# Patient Record
Sex: FEMALE | Race: WHITE
[De-identification: names, ages, dates, MRNs, and addresses within clinical notes are randomized per-mention and may not be internally consistent; named-entity substitution may affect disease eponyms.]

---

## 2018-01-18 ENCOUNTER — TRANSCRIPTION ENCOUNTER (OUTPATIENT)
Age: 58
End: 2018-01-18

## 2018-01-18 ENCOUNTER — NON-APPOINTMENT (OUTPATIENT)
Age: 58
End: 2018-01-18

## 2018-01-18 ENCOUNTER — APPOINTMENT (OUTPATIENT)
Dept: FAMILY MEDICINE | Facility: CLINIC | Age: 58
End: 2018-01-18
Payer: COMMERCIAL

## 2018-01-18 VITALS
RESPIRATION RATE: 14 BRPM | WEIGHT: 125 LBS | OXYGEN SATURATION: 98 % | HEART RATE: 75 BPM | BODY MASS INDEX: 22.15 KG/M2 | SYSTOLIC BLOOD PRESSURE: 100 MMHG | DIASTOLIC BLOOD PRESSURE: 60 MMHG | HEIGHT: 63 IN

## 2018-01-18 DIAGNOSIS — Z13.6 ENCOUNTER FOR SCREENING FOR CARDIOVASCULAR DISORDERS: ICD-10-CM

## 2018-01-18 DIAGNOSIS — Z13.29 ENCOUNTER FOR SCREENING FOR OTHER SUSPECTED ENDOCRINE DISORDER: ICD-10-CM

## 2018-01-18 DIAGNOSIS — Z87.891 PERSONAL HISTORY OF NICOTINE DEPENDENCE: ICD-10-CM

## 2018-01-18 DIAGNOSIS — Z80.2 FAMILY HISTORY OF MALIGNANT NEOPLASM OF OTHER RESPIRATORY AND INTRATHORACIC ORGANS: ICD-10-CM

## 2018-01-18 DIAGNOSIS — Z13.1 ENCOUNTER FOR SCREENING FOR DIABETES MELLITUS: ICD-10-CM

## 2018-01-18 DIAGNOSIS — Z12.11 ENCOUNTER FOR SCREENING FOR MALIGNANT NEOPLASM OF COLON: ICD-10-CM

## 2018-01-18 DIAGNOSIS — Z00.00 ENCOUNTER FOR GENERAL ADULT MEDICAL EXAMINATION W/OUT ABNORMAL FINDINGS: ICD-10-CM

## 2018-01-18 DIAGNOSIS — Z13.220 ENCOUNTER FOR SCREENING FOR LIPOID DISORDERS: ICD-10-CM

## 2018-01-18 DIAGNOSIS — F41.9 ANXIETY DISORDER, UNSPECIFIED: ICD-10-CM

## 2018-01-18 DIAGNOSIS — Z12.31 ENCOUNTER FOR SCREENING MAMMOGRAM FOR MALIGNANT NEOPLASM OF BREAST: ICD-10-CM

## 2018-01-18 LAB — CYTOLOGY CVX/VAG DOC THIN PREP: NORMAL

## 2018-01-18 PROCEDURE — 99386 PREV VISIT NEW AGE 40-64: CPT | Mod: 25

## 2018-01-18 PROCEDURE — 36415 COLL VENOUS BLD VENIPUNCTURE: CPT

## 2018-01-18 PROCEDURE — 93000 ELECTROCARDIOGRAM COMPLETE: CPT

## 2018-01-18 RX ORDER — FLUTICASONE PROPIONATE 50 UG/1
50 SPRAY, METERED NASAL
Qty: 1 | Refills: 1 | Status: ACTIVE | COMMUNITY
Start: 2018-01-18 | End: 1900-01-01

## 2018-01-31 ENCOUNTER — RESULT REVIEW (OUTPATIENT)
Age: 58
End: 2018-01-31

## 2018-02-03 ENCOUNTER — APPOINTMENT (OUTPATIENT)
Dept: FAMILY MEDICINE | Facility: CLINIC | Age: 58
End: 2018-02-03
Payer: COMMERCIAL

## 2018-02-03 VITALS
OXYGEN SATURATION: 96 % | BODY MASS INDEX: 22.15 KG/M2 | TEMPERATURE: 100.6 F | SYSTOLIC BLOOD PRESSURE: 110 MMHG | RESPIRATION RATE: 16 BRPM | HEART RATE: 115 BPM | DIASTOLIC BLOOD PRESSURE: 60 MMHG | WEIGHT: 125 LBS | HEIGHT: 63 IN

## 2018-02-03 DIAGNOSIS — R09.82 POSTNASAL DRIP: ICD-10-CM

## 2018-02-03 DIAGNOSIS — J10.1 INFLUENZA DUE TO OTHER IDENTIFIED INFLUENZA VIRUS WITH OTHER RESPIRATORY MANIFESTATIONS: ICD-10-CM

## 2018-02-03 DIAGNOSIS — R68.89 OTHER GENERAL SYMPTOMS AND SIGNS: ICD-10-CM

## 2018-02-03 DIAGNOSIS — Z87.898 PERSONAL HISTORY OF OTHER SPECIFIED CONDITIONS: ICD-10-CM

## 2018-02-03 DIAGNOSIS — R07.89 OTHER CHEST PAIN: ICD-10-CM

## 2018-02-03 LAB
FLUAV SPEC QL CULT: POSITIVE
FLUBV AG SPEC QL IA: NEGATIVE

## 2018-02-03 PROCEDURE — 87804 INFLUENZA ASSAY W/OPTIC: CPT | Mod: QW

## 2018-02-03 PROCEDURE — 99213 OFFICE O/P EST LOW 20 MIN: CPT | Mod: 25

## 2018-02-03 RX ORDER — OSELTAMIVIR PHOSPHATE 75 MG/1
75 CAPSULE ORAL TWICE DAILY
Qty: 10 | Refills: 0 | Status: ACTIVE | COMMUNITY
Start: 2018-02-03 | End: 1900-01-01

## 2024-05-09 ENCOUNTER — INPATIENT (INPATIENT)
Facility: HOSPITAL | Age: 64
LOS: 0 days | Discharge: HOME | DRG: 69 | End: 2024-05-10
Attending: STUDENT IN AN ORGANIZED HEALTH CARE EDUCATION/TRAINING PROGRAM | Admitting: HOSPITALIST
Payer: COMMERCIAL

## 2024-05-09 VITALS — WEIGHT: 128.75 LBS

## 2024-05-09 DIAGNOSIS — G45.9 TRANSIENT CEREBRAL ISCHEMIC ATTACK, UNSPECIFIED: ICD-10-CM

## 2024-05-09 LAB
ALBUMIN SERPL ELPH-MCNC: 4 G/DL — SIGNIFICANT CHANGE UP (ref 3.3–5)
ALP SERPL-CCNC: 64 U/L — SIGNIFICANT CHANGE UP (ref 40–120)
ALT FLD-CCNC: 30 U/L — SIGNIFICANT CHANGE UP (ref 12–78)
ANION GAP SERPL CALC-SCNC: 4 MMOL/L — LOW (ref 5–17)
APTT BLD: 28.7 SEC — SIGNIFICANT CHANGE UP (ref 24.5–35.6)
AST SERPL-CCNC: 21 U/L — SIGNIFICANT CHANGE UP (ref 15–37)
BASOPHILS # BLD AUTO: 0.05 K/UL — SIGNIFICANT CHANGE UP (ref 0–0.2)
BASOPHILS NFR BLD AUTO: 0.5 % — SIGNIFICANT CHANGE UP (ref 0–2)
BILIRUB SERPL-MCNC: 0.9 MG/DL — SIGNIFICANT CHANGE UP (ref 0.2–1.2)
BUN SERPL-MCNC: 20 MG/DL — SIGNIFICANT CHANGE UP (ref 7–23)
CALCIUM SERPL-MCNC: 9.5 MG/DL — SIGNIFICANT CHANGE UP (ref 8.5–10.1)
CHLORIDE SERPL-SCNC: 108 MMOL/L — SIGNIFICANT CHANGE UP (ref 96–108)
CK SERPL-CCNC: 57 U/L — SIGNIFICANT CHANGE UP (ref 26–192)
CO2 SERPL-SCNC: 29 MMOL/L — SIGNIFICANT CHANGE UP (ref 22–31)
CREAT SERPL-MCNC: 0.78 MG/DL — SIGNIFICANT CHANGE UP (ref 0.5–1.3)
EGFR: 85 ML/MIN/1.73M2 — SIGNIFICANT CHANGE UP
EOSINOPHIL # BLD AUTO: 0.1 K/UL — SIGNIFICANT CHANGE UP (ref 0–0.5)
EOSINOPHIL NFR BLD AUTO: 1 % — SIGNIFICANT CHANGE UP (ref 0–6)
GLUCOSE BLDC GLUCOMTR-MCNC: 96 MG/DL — SIGNIFICANT CHANGE UP (ref 70–99)
GLUCOSE SERPL-MCNC: 110 MG/DL — HIGH (ref 70–99)
HCT VFR BLD CALC: 41.2 % — SIGNIFICANT CHANGE UP (ref 34.5–45)
HGB BLD-MCNC: 13.6 G/DL — SIGNIFICANT CHANGE UP (ref 11.5–15.5)
IMM GRANULOCYTES NFR BLD AUTO: 0.5 % — SIGNIFICANT CHANGE UP (ref 0–0.9)
INR BLD: 0.99 RATIO — SIGNIFICANT CHANGE UP (ref 0.85–1.18)
LYMPHOCYTES # BLD AUTO: 1.89 K/UL — SIGNIFICANT CHANGE UP (ref 1–3.3)
LYMPHOCYTES # BLD AUTO: 18.4 % — SIGNIFICANT CHANGE UP (ref 13–44)
MCHC RBC-ENTMCNC: 28.9 PG — SIGNIFICANT CHANGE UP (ref 27–34)
MCHC RBC-ENTMCNC: 33 GM/DL — SIGNIFICANT CHANGE UP (ref 32–36)
MCV RBC AUTO: 87.5 FL — SIGNIFICANT CHANGE UP (ref 80–100)
MONOCYTES # BLD AUTO: 0.49 K/UL — SIGNIFICANT CHANGE UP (ref 0–0.9)
MONOCYTES NFR BLD AUTO: 4.8 % — SIGNIFICANT CHANGE UP (ref 2–14)
NEUTROPHILS # BLD AUTO: 7.7 K/UL — HIGH (ref 1.8–7.4)
NEUTROPHILS NFR BLD AUTO: 74.8 % — SIGNIFICANT CHANGE UP (ref 43–77)
PLATELET # BLD AUTO: 318 K/UL — SIGNIFICANT CHANGE UP (ref 150–400)
POTASSIUM SERPL-MCNC: 4 MMOL/L — SIGNIFICANT CHANGE UP (ref 3.5–5.3)
POTASSIUM SERPL-SCNC: 4 MMOL/L — SIGNIFICANT CHANGE UP (ref 3.5–5.3)
PROT SERPL-MCNC: 7.8 GM/DL — SIGNIFICANT CHANGE UP (ref 6–8.3)
PROTHROM AB SERPL-ACNC: 11.2 SEC — SIGNIFICANT CHANGE UP (ref 9.5–13)
RBC # BLD: 4.71 M/UL — SIGNIFICANT CHANGE UP (ref 3.8–5.2)
RBC # FLD: 13.3 % — SIGNIFICANT CHANGE UP (ref 10.3–14.5)
SODIUM SERPL-SCNC: 141 MMOL/L — SIGNIFICANT CHANGE UP (ref 135–145)
TROPONIN I, HIGH SENSITIVITY RESULT: 4.2 NG/L — SIGNIFICANT CHANGE UP
WBC # BLD: 10.28 K/UL — SIGNIFICANT CHANGE UP (ref 3.8–10.5)
WBC # FLD AUTO: 10.28 K/UL — SIGNIFICANT CHANGE UP (ref 3.8–10.5)

## 2024-05-09 PROCEDURE — 97161 PT EVAL LOW COMPLEX 20 MIN: CPT | Mod: GP

## 2024-05-09 PROCEDURE — 83036 HEMOGLOBIN GLYCOSYLATED A1C: CPT

## 2024-05-09 PROCEDURE — 70496 CT ANGIOGRAPHY HEAD: CPT | Mod: 26,MC

## 2024-05-09 PROCEDURE — 70450 CT HEAD/BRAIN W/O DYE: CPT | Mod: 26,MC

## 2024-05-09 PROCEDURE — 70551 MRI BRAIN STEM W/O DYE: CPT | Mod: 26

## 2024-05-09 PROCEDURE — 36415 COLL VENOUS BLD VENIPUNCTURE: CPT

## 2024-05-09 PROCEDURE — 0042T: CPT | Mod: MC

## 2024-05-09 PROCEDURE — 70498 CT ANGIOGRAPHY NECK: CPT | Mod: 26,MC

## 2024-05-09 PROCEDURE — 99285 EMERGENCY DEPT VISIT HI MDM: CPT

## 2024-05-09 PROCEDURE — 70551 MRI BRAIN STEM W/O DYE: CPT | Mod: MC

## 2024-05-09 PROCEDURE — 93010 ELECTROCARDIOGRAM REPORT: CPT

## 2024-05-09 PROCEDURE — 71045 X-RAY EXAM CHEST 1 VIEW: CPT | Mod: 26

## 2024-05-09 PROCEDURE — 76376 3D RENDER W/INTRP POSTPROCES: CPT

## 2024-05-09 PROCEDURE — 92523 SPEECH SOUND LANG COMPREHEN: CPT | Mod: GN

## 2024-05-09 PROCEDURE — 80048 BASIC METABOLIC PNL TOTAL CA: CPT

## 2024-05-09 PROCEDURE — 97165 OT EVAL LOW COMPLEX 30 MIN: CPT | Mod: GO

## 2024-05-09 PROCEDURE — 93306 TTE W/DOPPLER COMPLETE: CPT

## 2024-05-09 PROCEDURE — 80061 LIPID PANEL: CPT

## 2024-05-09 PROCEDURE — 92610 EVALUATE SWALLOWING FUNCTION: CPT | Mod: GN

## 2024-05-09 PROCEDURE — 82962 GLUCOSE BLOOD TEST: CPT

## 2024-05-09 RX ORDER — ACETAMINOPHEN 500 MG
650 TABLET ORAL ONCE
Refills: 0 | Status: DISCONTINUED | OUTPATIENT
Start: 2024-05-09 | End: 2024-05-10

## 2024-05-09 RX ORDER — MECLIZINE HCL 12.5 MG
25 TABLET ORAL ONCE
Refills: 0 | Status: DISCONTINUED | OUTPATIENT
Start: 2024-05-09 | End: 2024-05-10

## 2024-05-09 RX ORDER — ATORVASTATIN CALCIUM 80 MG/1
80 TABLET, FILM COATED ORAL ONCE
Refills: 0 | Status: COMPLETED | OUTPATIENT
Start: 2024-05-09 | End: 2024-05-09

## 2024-05-09 RX ORDER — ASPIRIN/CALCIUM CARB/MAGNESIUM 324 MG
325 TABLET ORAL ONCE
Refills: 0 | Status: COMPLETED | OUTPATIENT
Start: 2024-05-09 | End: 2024-05-09

## 2024-05-09 RX ADMIN — ATORVASTATIN CALCIUM 80 MILLIGRAM(S): 80 TABLET, FILM COATED ORAL at 20:14

## 2024-05-09 RX ADMIN — Medication 325 MILLIGRAM(S): at 20:14

## 2024-05-09 NOTE — ED PROVIDER NOTE - OBJECTIVE STATEMENT
65 y/o female, denies PMHx 13:00 with room spinning dizziness worse with head movement, right ear fullness, uri symptoms, Pt friend reports pt had periods of aphasia in car. Pt was given prednisone and meclozine which helped dizziness. Code stroke called due to reported aphasia 65 y/o female, denies PMHx 13:00 with room spinning dizziness worse with head movement, right ear fullness, uri symptoms, Pt friend reports pt had intermittent periods of expressive aphasia in car on way to ED. Pt was given prednisone and meclozine which helped dizziness. Code stroke called due to reported aphasia 63 y/o female, denies PMHx, reports 13:00 acute onset room spinning dizziness worse with head movement, right ear fullness, uri symptoms.  Urgent care eval: treated with po Prednisone & meclizine & advised to go to ED for further eval, r/o CVA.  Pt's friend driving the car reports pt had intermittent periods of expressive aphasia in car on way to ED. Pt reports the prednisone and meclizine helped her dizziness. Code Stroke called due to reported aphasia.

## 2024-05-09 NOTE — ED ADULT NURSE NOTE - OBJECTIVE STATEMENT
Pt presents to ED c/o sudden onset of dizziness at 1300hrs. Sent from [facility] to r/o stroke. Pt reports URI symptoms and right ear clogged, muffled hearing in R ear for past few days. Sister at bedside reports patient had brief period of expressive aphasia in car on way to ED. Pt denies H/A, CP, SOB, numbness/tingling, weakness. Patient endorses sinus pressure. Meclizine and Prednisone administered by [facility] PTA. Patient AxOx4, speech clear, ambulated steady from w/c to CT bed.

## 2024-05-09 NOTE — ED PROVIDER NOTE - PROGRESS NOTE DETAILS
Deepak Mooney for ED attending, Dr. Lock: [facility] center called to consult telestroke Deepak Mooney for ED attending, Dr. Lock: ct non-con negative, verbal report

## 2024-05-09 NOTE — ED ADULT NURSE NOTE - NSFALLRISKINTERV_ED_ALL_ED

## 2024-05-09 NOTE — CHART NOTE - NSCHARTNOTEFT_GEN_A_CORE
Discussion with: Case was discussed via audio only phone call with Dr. Lock on 5/9/2024 at 7:10 pm     HISTORY OF PRESENT ILLNESS:  Patient is a 65 y/o F with no significant PMHx, who p/w an acute onset of aphasia. At 11 am this morning patient developed a sudden onset of room spinning dizziness worsened with head movement and an ear fullness. She went to an urgent care where she received meclizine and prednisone which improved her symptoms. Afterwards, her friend drove her to the ED and noticed that patient was having episodes of aphasia while in the car, about 15 minutes prior to arrival. As per Dr. Lock, patient has a current NIHSS of 0 in the ED.     PMH/PSH is PAST MEDICAL & SURGICAL HISTORY:    CT HEAD IMAGING RESULTS:  CTH/CTA/CTP    Cerebral volume is within normal limits. No premature white matter   disease.    No acute intracranial hemorrhage. No midline shift or herniation.    Using a threshold of 30%, no rCBF defects are identified. Using a   threshold of 6s, there are no Tmax abnormalities. This would be   consistent with adequate cerebral blood flow without tissue at immediate   risk. No evidence of an acute stroke within the limitations of technique.   Small  infarcts or small emboli could be beyond the resolution   of this exam, and MRI with DWI could be of value. No evidence of a   perfusion mismatch. The time plot of the passage of the contrast bolus   appears within normal limits, without significant artifacts detected in   the arterial input function curve or during passage of venous contrast.    The left vertebral artery is dominant. There is tortuosity of the left V1   vertebral segment.    On the right, the ICA, ECA, CCA, and brachiocephalic artery are patent.   The left, the ICA, ECA, CCA patent. The vertebrals and subclavian   arteries are patent. No arterial dissection or ulcerated plaque.    Intracranially, there is calcified plaque with mild narrowing of the left   C4 ICA segment.    No intracranial aneurysms or large vessel occlusions. No large feeding   arteries or draining veins. The ACAs, MCAs, PCAs, and carotid siphons are   otherwise negative. The basilar artery and V4 vertebral segments are   otherwise negative. Abnormalities of  vessels and distal   intracranial branches are beyond the resolution of this technique, and   catheter angiography would be more sensitive.    Views of the brain parenchyma are unchanged, without arterial   enhancement. The dural venous sinuses are grossly patent, although this   examination wasn't optimized to evaluate the cerebral veins.    Degenerative changes in the cervical spine, with osteophytic foraminal   encroachment. No gross evidence of an acute fracture, although this   examination wasn't optimized to evaluate the spine.    The visualized sinuses and mastoids are clear. Limited views of the   orbits and visualized soft tissues of the neck, face, scalp, skull base,   and calvarium are otherwise unremarkable.    IMPRESSION:    1.  No acute intracranial hemorrhage.  2.  No retrievable clot or hemodynamically significant stenoses.  3.  MRI would be more sensitive for acute ischemia.      Labs:  CAPILLARY BLOOD GLUCOSE      POCT Blood Glucose.: 96 mg/dL (09 May 2024 20:13)    Platelet Count - Automated: 318 K/uL (05-09-24 @ 19:20)    PT/INR - ( 09 May 2024 19:20 )   PT: 11.2 sec;   INR: 0.99 ratio         PTT - ( 09 May 2024 19:20 )  PTT:28.7 sec  NEUROLOGIC EXAMINATION deferred – interprofessional audio discussion only     Inclusion Criteria:  Clearly defined time onset within 4.5 hours of treatment Yes [] No []    Ischemic stroke with a measurable deficit on NIHSS or a non-measurable deficit that is deemed disabling?  Yes [] no []  or  Unclear time of onset wake-up with stroke symptoms yes [] no[]  If yes:  [] Treatment can be initiated within 4.5 hrs. from symptom recognition  [] MRI can be obtained acutely from the emergency department  [] Not an endovascular stroke therapy candidate  [] Baseline functional independence.  Pre-stroke mRS of 0-1  [] NIHSS less than 26  [] DW-MRI with diffusion-positive. FLAIR – negative lesions indicating timing of stroke onset less than 4.5 hrs.  [] MRI mismatch between abnormal signal on DW-MRI and no visible signal change on FLAIR        Review thrombolytic CONTRAINDICATIONS  [] None    ABSOLUTE EXCLUSION CRITERIA:  [] Patient outside of the appropriate time window for IV thrombolysis  [] Evidence of intracranial hemorrhage on pretreatment CT scan (CT must be read by an attending radiologist or attending neurologist)  [] Head CT findings suggesting an established acute cerebral infarction as evidenced by krupa hypodensity, regardless of size.  [] Clinical presentation consistent with subarachnoid hemorrhage, even with normal CT scan  [] Active internal bleeding  [] Known bleeding diathesis (including but not limited to platelet count < 100,000, use of oral anticoagulants with INR>1.7, use of full dose low molecular       weight heparin within the last 24 hours, use of unfractionated heparin AND a prolonged PTT (> 40sec), use of direct thrombin inhibitor (e.g. dabigatran) or oral direct Factor Xa inhibitor (e.g. rivaroxaban, apixaban) within 48 hours) with any degree of abnormality on any coagulation test; any DOAC taken within 3 hours of presentation, regardless of coagulation test results  [] Systolic pressure >= 185 mmHg or diastolic pressure 110 mmHg on repeated measurements at the time treatment is to begin  [] Care team unable to determine eligibility for IV thrombolysis  [] Patient, family, or surrogate declines and understands risks and benefits of treatment.  [] For wake-up Protocol patients: DW-MRI lesions larger than one-third of the MCA territory    RELATIVE EXCLUSION CRITERIA  [] Isolated neurological deficits (except for aphasia or hemianopsia)  [x] stroke severity too mild (non-disabling)  [] Seizure at onset with post-ictal residual neurological impairment  [] Gastrointestinal, genitourinary or respiratory hemorrhage within 21 days   [] Major surgery within 14 days   [] Blood glucose level < 50 or > 400 mG/dL  [] CPR with chest compressions or minor surgery (including liver and kidney biopsy, thoracocentesis, lumbar puncture) within 10 days   [] Arterial puncture at non-compressible site within 7 days   [] Evidence of acute trauma (fracture)   Significant head trauma or prior stroke in the previous 3 months  History of previous intracranial hemorrhage  Intracranial or intraspinal surgery within past 3 months[] Diabetic hemorrhagic retinopathy or ophthalmic bleeding  [] Pregnancy or peripartum; no nursing post- treatment  [] Post-myocardial infarction pericarditis  [] Peritoneal dialysis or hemodialysis or severe hepatic disease   [] Known bacterial endocarditis   [] Life expectancy less than 6 months or sever co-morbid illness   [] Known cerebral vascular malformation, untreated intracranial aneurysm or brain tumor (may consider IV alteplase in patients with CNS lesions that have a very low likelihood of hemorrhage, such as small un-ruptured aneurysms or benign tumors with low vascularity.  [] Social/Episcopal reason  [] Other ____________________    ASSESSMENT: Patient is a 65 y/o F with no significant PMHx, who p/w an acute onset of aphasia, 15 minutes PTA at [facility]. On exam in the ED patient has a current NIHSS of 0. CTH read no acute abnormality. CTA read no LVO and CTP read no perfusion deficits. Patient is not a current TNK candidate as she has no acute focal disabling deficit on exam. She is not a current neuro IR candidate as there is no LVO on vessel imaging.     RECOMMENDATIONS:  [] TIA workup

## 2024-05-09 NOTE — ED PROVIDER NOTE - PHYSICAL EXAMINATION
Gen: middle age female, awake, alert, NAD mildly anxious   Head: NC/AT  Eyes: PERRL, EOMI  ENT: oropharynx clear, mucous membranes, right ear: + fluids, no erythema, no bulging   Card: regular rate and rhythm, normal radial pulse  Resp: Breath sounds clear bilaterally, respirations normal  Abd: soft, non-tender + bowel sounds, no rebound, guarding or mass   Msk: MAEx4 without focal deformities, tenderness or swelling  Skin: no tactile warmth, no rash,   Neuro: Alert and oriented, no focal deficits, no motor or sensory deficits, NIHSS=0, normal speech, CN II-XII intact, Gen: middle age female, awake, alert, NAD, mildly anxious   Head: NC/AT  Eyes: PERRL, EOMI  ENT: oropharynx clear, mucous membranes, right ear: + fluid, no erythema, not bulging   Card: regular rate and rhythm, normal radial pulse  Resp: Breath sounds clear bilaterally, respirations normal  Abd: soft, non-tender + bowel sounds, no rebound, guarding or mass   Msk: MAEx4 without focal deformities, tenderness or swelling  Skin: no tactile warmth, no rash,   Neuro: Alert and oriented, no focal deficits, no motor or sensory deficits, NIHSS=0, normal speech, CN II-XII intact, FTN & HTS normal

## 2024-05-09 NOTE — ED ADULT TRIAGE NOTE - CHIEF COMPLAINT QUOTE
Pt to ED with sudden onset of vertigo at 1pm. Sent from [facility] to r/o stroke. Pt reports URI symptoms and right ear clogged x few days. In the car on the way to ED had brief period of expressive aphasia. MD Lock in [place] for stroke consult. Denies headache, reports sinus pressure. Received Meclizine and Prednisone in [facility]. Code stroke at 1900

## 2024-05-09 NOTE — ED PROVIDER NOTE - CARE PLAN
1 Principal Discharge DX:	TIA (transient ischemic attack)  Secondary Diagnosis:	Expressive aphasia  Secondary Diagnosis:	Vertigo

## 2024-05-09 NOTE — ED PROVIDER NOTE - CLINICAL SUMMARY MEDICAL DECISION MAKING FREE TEXT BOX
63 y/o female, denies PMHx 13:00 with room spinning dizziness worse with head movement, right ear fullness, uri symptoms, Pt friend reports pt had intermittent periods of expressive aphasia in car on way to ED. Pt was given prednisone and meclozine which helped dizziness. Code stroke called due to reported aphasia.  Plan: code stroke, EKG, CXR Stroke CT, stroke labs, dysphagia screening NIHSS, discuss with stroke neurology    CTA perfusion resulted, no LVO,  narrow left C4 segment of ICA, discussed with telestroke, not candidate for TNK or neuro IR intervention, advises admission for TIA work-up advises load with 325 aspiring and Lipitor 80mg   , 65 y/o female, denies PMHx 13:00 with room spinning dizziness worse with head movement, right ear fullness, uri symptoms, Pt friend reports pt had intermittent periods of expressive aphasia in car on way to ED. Pt was given prednisone and meclozine which helped dizziness. Code stroke called due to reported aphasia.  Plan: code stroke, EKG, CXR Stroke CT, stroke labs, dysphagia screening NIHSS, discuss with stroke neurology    19:15, Deepak Mooney for ED attending, Dr. Lock: ct non-con negative, verbal report    CTA perfusion resulted, no LVO,  narrow left C4 segment of ICA, discussed with Tele-Stroke neurologist: not candidate for TNK nor neuro IR intervention, advises admission for TIA work-up advises load with 325 aspiring and Lipitor 80mg     21:50, ALDEN Lock MD:  Labs unremarkable.  case d/w & accepted by Dr. Vazquez for Tele admit for TIA w/u incl. inpt MRI brain & formal Neuro consult tomorrow.  , 65 y/o female, denies PMHx, BIB pvt car per urgent care referral c/o'ing  room spinning dizziness acute onset 1 PM, worse with head movement, right ear fullness, uri symptoms, Pt friend reports pt had intermittent periods of expressive aphasia in car on way to ED. Pt was given prednisone and meclizine at urgent care which helped dizziness. Code Stroke called due to reported aphasia.  Plan: Code Stroke, EKG, CXR, stroke CT studies, stroke labs, dysphagia screening, NIHSS, discuss with Tele-Stroke Neurology    19:15, Deepak Mooney for ED attending, Dr. Lock: ct non-con negative, verbal report    NIHSS = 0, no neuro deficit noted; pt currently w/o vertigo/dizzy symptoms.    CTA perfusion resulted, no LVO,  +narrow left C4 segment of ICA, discussed with Tele-Stroke neurologist: not candidate for TNK nor neuro IR intervention, advises admission for TIA work-up advises load with 325 aspirin and Lipitor 80mg     21:50, ALDEN Lock MD:  Labs unremarkable.  case d/w & accepted by Dr. Vazquez for Tele admit for TIA w/u incl. inpt MRI brain & formal Neuro consult tomorrow.  ,

## 2024-05-09 NOTE — PHARMACOTHERAPY INTERVENTION NOTE - COMMENTS
Medication history complete, reviewed medications with patient and confirmed with doctor first med hx. Patient is not on meds.

## 2024-05-10 ENCOUNTER — TRANSCRIPTION ENCOUNTER (OUTPATIENT)
Age: 64
End: 2024-05-10

## 2024-05-10 ENCOUNTER — RESULT REVIEW (OUTPATIENT)
Age: 64
End: 2024-05-10

## 2024-05-10 VITALS
SYSTOLIC BLOOD PRESSURE: 90 MMHG | RESPIRATION RATE: 18 BRPM | DIASTOLIC BLOOD PRESSURE: 54 MMHG | OXYGEN SATURATION: 96 % | HEART RATE: 91 BPM | TEMPERATURE: 99 F

## 2024-05-10 LAB
A1C WITH ESTIMATED AVERAGE GLUCOSE RESULT: 5.5 % — SIGNIFICANT CHANGE UP (ref 4–5.6)
ANION GAP SERPL CALC-SCNC: 5 MMOL/L — SIGNIFICANT CHANGE UP (ref 5–17)
BUN SERPL-MCNC: 22 MG/DL — SIGNIFICANT CHANGE UP (ref 7–23)
CALCIUM SERPL-MCNC: 9.5 MG/DL — SIGNIFICANT CHANGE UP (ref 8.5–10.1)
CHLORIDE SERPL-SCNC: 113 MMOL/L — HIGH (ref 96–108)
CHOLEST SERPL-MCNC: 215 MG/DL — HIGH
CO2 SERPL-SCNC: 24 MMOL/L — SIGNIFICANT CHANGE UP (ref 22–31)
CREAT SERPL-MCNC: 0.69 MG/DL — SIGNIFICANT CHANGE UP (ref 0.5–1.3)
EGFR: 97 ML/MIN/1.73M2 — SIGNIFICANT CHANGE UP
ESTIMATED AVERAGE GLUCOSE: 111 MG/DL — SIGNIFICANT CHANGE UP (ref 68–114)
GLUCOSE BLDC GLUCOMTR-MCNC: 123 MG/DL — HIGH (ref 70–99)
GLUCOSE BLDC GLUCOMTR-MCNC: 226 MG/DL — HIGH (ref 70–99)
GLUCOSE BLDC GLUCOMTR-MCNC: 95 MG/DL — SIGNIFICANT CHANGE UP (ref 70–99)
GLUCOSE SERPL-MCNC: 96 MG/DL — SIGNIFICANT CHANGE UP (ref 70–99)
HDLC SERPL-MCNC: 49 MG/DL — LOW
LIPID PNL WITH DIRECT LDL SERPL: 154 MG/DL — HIGH
NON HDL CHOLESTEROL: 166 MG/DL — HIGH
POTASSIUM SERPL-MCNC: 3.9 MMOL/L — SIGNIFICANT CHANGE UP (ref 3.5–5.3)
POTASSIUM SERPL-SCNC: 3.9 MMOL/L — SIGNIFICANT CHANGE UP (ref 3.5–5.3)
SODIUM SERPL-SCNC: 142 MMOL/L — SIGNIFICANT CHANGE UP (ref 135–145)
TRIGL SERPL-MCNC: 68 MG/DL — SIGNIFICANT CHANGE UP

## 2024-05-10 PROCEDURE — 99223 1ST HOSP IP/OBS HIGH 75: CPT

## 2024-05-10 PROCEDURE — 93306 TTE W/DOPPLER COMPLETE: CPT | Mod: 26

## 2024-05-10 PROCEDURE — 76376 3D RENDER W/INTRP POSTPROCES: CPT | Mod: 26

## 2024-05-10 RX ORDER — ATORVASTATIN CALCIUM 80 MG/1
80 TABLET, FILM COATED ORAL AT BEDTIME
Refills: 0 | Status: DISCONTINUED | OUTPATIENT
Start: 2024-05-10 | End: 2024-05-10

## 2024-05-10 RX ORDER — MECLIZINE HCL 12.5 MG
1 TABLET ORAL
Qty: 42 | Refills: 0
Start: 2024-05-10 | End: 2024-05-23

## 2024-05-10 RX ORDER — ATORVASTATIN CALCIUM 80 MG/1
1 TABLET, FILM COATED ORAL
Qty: 30 | Refills: 0
Start: 2024-05-10 | End: 2024-06-08

## 2024-05-10 RX ORDER — ASPIRIN/CALCIUM CARB/MAGNESIUM 324 MG
81 TABLET ORAL DAILY
Refills: 0 | Status: DISCONTINUED | OUTPATIENT
Start: 2024-05-10 | End: 2024-05-10

## 2024-05-10 RX ORDER — ASPIRIN/CALCIUM CARB/MAGNESIUM 324 MG
1 TABLET ORAL
Qty: 30 | Refills: 0
Start: 2024-05-10 | End: 2024-06-08

## 2024-05-10 RX ADMIN — Medication 81 MILLIGRAM(S): at 12:24

## 2024-05-10 NOTE — OCCUPATIONAL THERAPY INITIAL EVALUATION ADULT - PERTINENT HX OF CURRENT PROBLEM, REHAB EVAL
Per H&P- pt is a 65 y/o female, denies PMHx presented with room spinning, dizziness which was worse with head movement, she also had right ear fullness, uri symptoms, Patient went to an urgent care with these symptoms. Patient's friend reported that patient had intermittent periods of expressive aphasia in car on way to ED. Patient was given prednisone and meclozine which helped dizziness in urgent care. Code stroke was called in ED due to reported aphasia. No other complain.     MRI head: No acute infarct.  CTA brain/neck, CT brain: 1.  No acute intracranial hemorrhage. 2.  No retrievable clot or hemodynamically significant stenoses. 3.  MRI would be more sensitive for acute ischemia.

## 2024-05-10 NOTE — DISCHARGE NOTE PROVIDER - HOSPITAL COURSE
Admission HPI: 65 y/o Female, denies PMHx presented with room spinning, dizziness which was worse with head movement, she also had right ear fullness, uri symptoms, Patient went to an urgent care with these symptoms. Patient's friend reported that patient had intermittent periods of expressive aphasia in car on way to ED. Patient was given prednisone and meclozine which helped dizziness in urgent care. Code stroke was called in ED due to reported aphasia. No other complain.     5/10: Patient seen and examined at bedside. Symptoms fully resolved. MRI negative for stroke. Orthostatics negative. Seen by Neurology. Incidental finding on echo - discussed with Dr. Saravia - will follow up outpatient. Stable for discharge.     #Dizziness, likely vertigo, r/o CVA/TIA  -RESOLVED  -MRI brain negative for stroke  -Echo with incidental findings - discussed with Dr. Saravia - will follow up outpatient for further workup  -A1C 5.5. Lipid panel with elevated LDL   -PT/OT/Speech evaluation  -Neurology consult appreciated, no suspicion for TIA/CVA    #HLD  Discussed lifestyle modifications. Statin/ASA    #Aneurysmal Interatrial septum is aneurysmal.  #Interatrial septum is stretched and displaced to the right, consistent with left atrial volume overload  Very low suspicion of TIA. Will follow up with Cardiology upon discharge for further workup/monitoring.     Patient was discharged to: Home    Items to follow up as outpatient: Cardiology follow up to monitor echo findings     Physical exam at the time of discharge:  LOS: 1d    VITALS:   T(C): 36.4 (05-10-24 @ 08:50), Max: 37 (05-10-24 @ 01:15)  HR: 78 (05-10-24 @ 08:50) (62 - 89)  BP: 97/60 (05-10-24 @ 08:50) (97/60 - 139/78)  RR: 18 (05-10-24 @ 08:50) (16 - 18)  SpO2: 96% (05-10-24 @ 08:50) (95% - 99%)    PHYSICAL EXAM:  GENERAL: NAD  HEAD:  Atraumatic, Normocephalic  EYES: EOMI, PERRLA, conjunctiva and sclera clear  ENMT: No tonsillar erythema, exudates, or enlargement; Moist mucous membranes  NECK: Supple, No JVD, Normal thyroid  HEART: Regular rate and rhythm; No murmurs, rubs, or gallops  RESPIRATORY: Unlabored respirations. CTA B/L, No W/R/R  ABDOMEN: Soft, Nontender, Nondistended; Bowel sounds present  NEUROLOGY: A&Ox3, nonfocal, moving all extremities  EXTREMITIES:  2+ Peripheral Pulses, No clubbing, cyanosis, or edema  SKIN: warm, dry, normal color, no rash or abnormal lesions

## 2024-05-10 NOTE — H&P ADULT - HISTORY OF PRESENT ILLNESS
63 y/o Female, denies PMHx presented with room spinning, dizziness which was worse with head movement, she also had right ear fullness, uri symptoms, Patient went to an urgent care with these symptoms. Patient's friend reported that patient had intermittent periods of expressive aphasia in car on way to ED. Patient was given prednisone and meclozine which helped dizziness in urgent care. Code stroke was called in ED due to reported aphasia. No other complain.

## 2024-05-10 NOTE — DISCHARGE NOTE NURSING/CASE MANAGEMENT/SOCIAL WORK - PATIENT PORTAL LINK FT
You can access the FollowMyHealth Patient Portal offered by [facility] by registering at the following website: [email or web address]. By joining [facility]’s FollowMyHealth portal, you will also be able to view your health information using other applications (apps) compatible with our system.

## 2024-05-10 NOTE — SWALLOW BEDSIDE ASSESSMENT ADULT - SLP GENERAL OBSERVATIONS
(4) no limitation pt seated on the side of the bed: alert and verbally interactive: No overt deficits in lexical retrieval, both in conversation and in picture naming to confrontation: Comprehension for Complex material intact: repetition intact: Sentence formulation intact> :

## 2024-05-10 NOTE — PATIENT PROFILE ADULT - SURGICAL SITE INCISION
10/21/2022      Eric Orozco  [address]  [place] [address]          Dear Eric,    I am writing on behalf of [facility] because you missed your appointment with me on 10/20/2022. I understand things come up that cannot be avoided. In the future, I ask that you call my office at least 24 hours before your appointment if you need to cancel. This allows me to see another patient who needs care.     You can also cancel or reschedule appointments easily through LiveWell. This online tool also lets you view test results and health information, request or renew prescriptions, pay bills and talk with your health care team. If not yet signed up, you can sign up for [email or web address] at [email or web address] at any time.    Your health matters to me. Please call my office soon so your appointment can be rescheduled. My office number is [phone].     Thank you,      Hussein Crump MD  [facility]-[address] [address]  Phone: [phone]  Fax: [phone]  
no

## 2024-05-10 NOTE — H&P ADULT - NSHPPHYSICALEXAM_GEN_ALL_CORE
T(C): 36.7 (05-09-24 @ 20:18), Max: 36.7 (05-09-24 @ 20:18)  HR: 64 (05-09-24 @ 21:30) (62 - 74)  BP: 120/79 (05-09-24 @ 21:30) (120/79 - 139/78)  RR: 16 (05-09-24 @ 21:30) (16 - 18)  SpO2: 98% (05-09-24 @ 21:30) (97% - 99%)    CONSTITUTIONAL: Well groomed, no apparent distress  EYES: PERRLA and symmetric, EOMI, No conjunctival or scleral injection, non-icteric  ENMT: Oral mucosa with moist membranes. no pharyngeal injection or exudates             NECK: Supple, symmetric and without tracheal deviation   RESP: No respiratory distress, no use of accessory muscles; CTA b/l, no WRR  CV: RRR, +S1S2, no MRG; no JVD; no peripheral edema  GI: Soft, NT, ND, no rebound, no guarding; no palpable masses;   LYMPH: No cervical LAD or tenderness; no axillary LAD or tenderness; no inguinal LAD or tenderness  MSK: Normal gait; Normal ROM without pain, normal muscle strength/tone  SKIN: No rashes or ulcers noted;   NEURO: CN II-XII intact; normal reflexes in upper and lower extremities, sensation intact in upper and lower extremities b/l to light touch   PSYCH: Appropriate insight/judgment; A+O x 3, mood and affect appropriate, recent/remote memory intact

## 2024-05-10 NOTE — DIETITIAN INITIAL EVALUATION ADULT - OTHER INFO
65 y/o Female, denies PMHx presented with room spinning, dizziness which was worse with head movement, she also had right ear fullness, uri symptoms, Patient went to an urgent care with these symptoms. Patient's friend reported that patient had intermittent periods of expressive aphasia in car on way to ED.  Admit for TIA     Remains FULL CODE. Endorses good appetite since admit (x 1 day). Seen by SLP on 5/10 - rec'd regular diet w/ thin liquids. Reports UBW of ~129# ; bed scale wt of 127# taken by RD on 5/10/24. Unable to identify any pertinent wt changes at this time. NFPE reveals mild clavicle wasting ONLY - appears thin. Does not meet criteria for PCM at this time. C/w DASH/TLC diet as tolerated. Denies all ONS. Encourage protein-rich foods, maximize food preferences. See below for other recommendations.

## 2024-05-10 NOTE — CONSULT NOTE ADULT - SUBJECTIVE AND OBJECTIVE BOX
Neurology Consult requested by:   Patient is a 64y old  Female who presents with a chief complaint of TIA (10 May 2024 00:22)     HPI:  63 y/o woman wo significant PMHx presented c/o suden onset vertigo, worse when she moved her head. Feeling pressure over forehead, behind eyes, noted her ears felt full, right more than left.  Patient went to an urgent care with these symptoms. Patient's sister noted that patient had intermittent periods of difficulty speaking while in the car on way to ED. Patient recalls event, feels it was due to being anxious about what she was feeling.  Patient was given prednisone and meclozine which helped dizziness in urgent care. In ED Code stroke was called. Sx resolved       PAST MEDICAL & SURGICAL HISTORY:  No pertinent past medical history      No significant past surgical history        FAMILY HISTORY:  No pertinent family history in first degree relatives      Social Hx:  Nonsmoker, no drug or alcohol use  Medications and Allergies ReviewedMEDICATIONS  (STANDING):  aspirin enteric coated 81 milliGRAM(s) Oral daily  atorvastatin 80 milliGRAM(s) Oral at bedtime     ROS: Pertinent positives in HPI, all other ROS were reviewed and are negative.      Examination:   Vital Signs Last 24 Hrs  T(C): 36.4 (10 May 2024 08:50), Max: 37 (10 May 2024 01:15)  T(F): 97.5 (10 May 2024 08:50), Max: 98.6 (10 May 2024 01:15)  HR: 78 (10 May 2024 08:50) (62 - 89)  BP: 97/60 (10 May 2024 08:50) (97/60 - 139/78)  BP(mean): 92 (09 May 2024 21:30) (92 - 106)  RR: 18 (10 May 2024 08:50) (16 - 18)  SpO2: 96% (10 May 2024 08:50) (95% - 99%)    Parameters below as of 10 May 2024 08:50  Patient On (Oxygen Delivery Method): room air      General: Cooperative, NAD   NECK: supple, no masses  ENT: Normal hearing   Vascular : no carotid bruits,   Lungs: CTAB  Chest: RRR, no murmurs  Extremities: nontender, no edema  Musculoskeletal: no adventitious movements, no joint stiffness  Skin: no rash    Neurological Examination:  NIHSS:0  MS: AOx3. Appropriately interactive, normal affect. Speech fluent w/o paraphasic error, repetition, naming, reading is intact   CN: VFFTC, PERLL, EOMI, V1-3 sensation intact, face symmetric, hearing intact, palate elevates symmetrically, tongue midline, SCM equal bilaterally  Motor: normal bulk and tone, no tremor, rigidity or bradykinesia.  5/5 all over   Sens: Intact to light touch.    Reflexes: 2/4 all over, downgoing toes b/l  Coord:  No dysmetria, LINDY intact   Gait: Cannot test    Labs: Reviewed  Basic Metabolic Panel (05.10.24 @ 07:56)   Sodium: 142 mmol/L  Potassium: 3.9 mmol/L  Chloride: 113 mmol/L  Carbon Dioxide: 24 mmol/L  Anion Gap: 5 mmol/L  Blood Urea Nitrogen: 22 mg/dL  Creatinine: 0.69 mg/dL  Glucose: 96 mg/dL  Calcium: 9.5 mg/dL  eGFR: 97      Complete Blood Count + Automated Diff (05.09.24 @ 19:20)   WBC Count: 10.28 K/uL  RBC Count: 4.71 M/uL  Hemoglobin: 13.6 g/dL  Hematocrit: 41.2 %  Mean Cell Volume: 87.5 fl  Mean Cell Hemoglobin: 28.9 pg  Mean Cell Hemoglobin Conc: 33.0 gm/dL  Red Cell Distrib Width: 13.3 %  Platelet Count - Automated: 318 K/uL        < from: MR Head No Cont (05.09.24 @ 23:30) >  Comparison: CTA head and neck 5/9/2024.    Findings:    There is no acute infarct. There is no evidence of mass or intracranial   hemorrhage. Ventricles and sulci are normal in size and configuration for   the patient's stated age. One or two scatteredtiny ventral and   subcortical white matter FLAIR hyperintensities are entirely nonspecific,   although likely due to chronic microangiopathy. No midline shift or other   significant mass effect is noted. Gray-white differentiation is   maintained throughout. Normal flow voids are maintained in the dominant   arterial and venous structures.    There is trace layering secretions in the left sphenoid sinus, the   remaining paranasal sinuses and tympanomastoid spaces are clear. Orbits   and orbital contents are unremarkable.    IMPRESSION:    No acute infarct.    < end of copied text >      < from: CT Brain Perfusion Maps Stroke (05.09.24 @ 19:27) >    FINDINGS:        Cerebral volume is within normal limits. No premature white matter   disease.    No acute intracranial hemorrhage. No midline shift or herniation.    Using a threshold of 30%, no rCBF defects are identified. Using a   threshold of 6s, there are no Tmax abnormalities. This would be   consistent with adequate cerebral blood flow without tissue at immediate   risk. No evidence of an acute stroke within the limitations of technique.   Small  infarcts or small emboli could be beyond the resolution   of this exam, and MRI with DWI could be of value. No evidence of a   perfusion mismatch. The time plot of the passage of the contrast bolus   appears within normal limits, without significant artifacts detected in   the arterial input function curve or during passage of venous contrast.    The left vertebral artery is dominant. There is tortuosity of the left V1   vertebral segment.    On the right, theICA, ECA, CCA, and brachiocephalic artery are patent.   The left, the ICA, ECA, CCA patent. The vertebrals and subclavian   arteries are patent. No arterial dissection or ulcerated plaque.    Intracranially, there is calcified plaque with mild narrowing of the left   C4 ICA segment.    No intracranial aneurysms or large vessel occlusions. No large feeding   arteries or draining veins. The ACAs, MCAs, PCAs, and carotid siphons are   otherwise negative. The basilar artery and V4 vertebral segments are   otherwise negative. Abnormalities of  vessels and distal   intracranial branches are beyond the resolution of this technique, and   catheter angiography would be more sensitive.    Views of the brain parenchyma are unchanged, without arterial   enhancement. The dural venous sinuses are grossly patent, although this   examination wasn't optimized to evaluate the cerebral veins.    Degenerative changes in the cervical spine, with osteophytic foraminal   encroachment. No gross evidence of an acute fracture, although this   examination wasn't optimized to evaluate the spine.    The visualized sinuses and mastoids are clear. Limited views of the   orbits and visualized soft tissues of the neck, face, scalp, skull base,   and calvarium are otherwise unremarkable.    IMPRESSION:    1.  No acute intracranial hemorrhage.  2.  No retrievable clot or hemodynamically significant stenoses.  3.  MRI would be more sensitive for acute ischemia.    < end of copied text >

## 2024-05-10 NOTE — SWALLOW BEDSIDE ASSESSMENT ADULT - SWALLOW EVAL: DIAGNOSIS
1. oral-pharyngeal swallow skills within normal limits for regular texture. 2. No evidence for primary linguistic pathology.

## 2024-05-10 NOTE — CONSULT NOTE ADULT - ASSESSMENT
65 y/o woman presented with transient vertigo, diff speaking, now resolved. NIHSS 0. imaging shows no acute changes, no LVO. Possible neuronitis, anxiety. No evidence for CVA. less likely TIA.  Suggest:  f/u with ENT as OPT

## 2024-05-10 NOTE — H&P ADULT - ASSESSMENT
A/P:    1.  TIA  -follow clinically in telemetry unit with neuro checks  -follow MRI brain  -follow Echo  -follow A1C, lipid profile   -follow PT/OT/Speech evaluation  -follow Neurology consult  -started on Aspirin and Statin    2.  SCD for DVT ppx    3.  Code status  -Full code

## 2024-05-10 NOTE — OCCUPATIONAL THERAPY INITIAL EVALUATION ADULT - ADDITIONAL COMMENTS
Pt resides in a  alone, walk in shower and standard toilet (no DME in bathroom), (I) with all ADL/IADL tasks, walked without AD, +, RHD

## 2024-05-10 NOTE — DIETITIAN INITIAL EVALUATION ADULT - ORAL INTAKE PTA/DIET HISTORY
Pt lives at home alone; cooks/grocery shops for self w/o difficulty. Reports fair appetite consumes 3 meals/day, likely meeting fair PO intake of 50-75% of ENN.

## 2024-05-10 NOTE — DISCHARGE NOTE PROVIDER - NSDCCPCAREPLAN_GEN_ALL_CORE_FT
PRINCIPAL DISCHARGE DIAGNOSIS  Diagnosis: Vertigo  Assessment and Plan of Treatment: Your workup was negative for a stroke. You may continue Meclizine as needed for further episodes of vertigo. Please follow up with your Primary Care and ENT doctor upon discharge.      SECONDARY DISCHARGE DIAGNOSES  Diagnosis: Abnormal echocardiogram  Assessment and Plan of Treatment: Please follow up with the Cardiologist within 2 weeks of discharge for further workup of echocardiogram findings as we discussed.

## 2024-05-10 NOTE — DISCHARGE NOTE PROVIDER - CARE PROVIDER_API CALL
Nette Saravia  Cardiology  [address] [place] [address]  Phone: [phone]  Fax: [phone]  Follow Up Time: 2 weeks

## 2024-05-10 NOTE — DIETITIAN INITIAL EVALUATION ADULT - PERTINENT LABORATORY DATA
05-10    142  |  113<H>  |  22  ----------------------------<  96  3.9   |  24  |  0.69    Ca    9.5      10 May 2024 07:56    TPro  7.8  /  Alb  4.0  /  TBili  0.9  /  DBili  x   /  AST  21  /  ALT  30  /  AlkPhos  64  05-09  POCT Blood Glucose.: 123 mg/dL (05-10-24 @ 12:05)

## 2024-05-10 NOTE — DISCHARGE NOTE NURSING/CASE MANAGEMENT/SOCIAL WORK - NSDCPEFALRISK_GEN_ALL_CORE
For information on Fall & Injury Prevention, visit: [email or web address] OR  [email or web address] OR  https://www.cdc.gov/steadi/patient.html

## 2024-05-10 NOTE — PHYSICAL THERAPY INITIAL EVALUATION ADULT - MODALITIES TREATMENT COMMENTS
pt left supine on stretcher in hallway post Eval; HM intact; transporter William taking pt for echocardiogram; ivanna well; denied pain / symptoms

## 2024-05-10 NOTE — DISCHARGE NOTE PROVIDER - NSDCMRMEDTOKEN_GEN_ALL_CORE_FT
aspirin 81 mg oral delayed release tablet: 1 tab(s) orally once a day  Lipitor 40 mg oral tablet: 1 tab(s) orally once a day (at bedtime)  meclizine 25 mg oral tablet: 1 tab(s) orally every 8 hours as needed for Dizziness

## 2024-05-10 NOTE — DIETITIAN INITIAL EVALUATION ADULT - ADD RECOMMEND
1) C/w DASH/TLC diet as tolerated  2) Encourage protein-rich foods, maximize food preferences  3) Monitor bowel movements, if no BM for >3 days, consider implementing bowel regimen.  4) MVI w/ minerals daily to ensure 100% RDA met  5) Monitor lytes/ min and replete prn.  6) Obtain vitamin D 25OH level to assess nutriture  7) Confirm goals of care regarding nutrition support  RD will continue to monitor PO intake, labs, hydration, and wt prn.

## 2024-05-10 NOTE — OCCUPATIONAL THERAPY INITIAL EVALUATION ADULT - PRECAUTIONS/LIMITATIONS, REHAB EVAL
diet: DASH/TLC, elevate HOB 30 degrees, supervise meals/aspiration precautions/cardiac precautions/fall precautions

## 2024-05-10 NOTE — SWALLOW BEDSIDE ASSESSMENT ADULT - NS SPL SWALLOW CLINIC TRIAL FT
Swallow skills and speech -language are within normal limits.  Pt is at baseline and Service will not follow.  Disc with NSg and with Attending.

## 2024-05-10 NOTE — DIETITIAN INITIAL EVALUATION ADULT - PERTINENT MEDS FT
Hospitalist MEDICATIONS  (STANDING):  aspirin enteric coated 81 milliGRAM(s) Oral daily  atorvastatin 80 milliGRAM(s) Oral at bedtime    MEDICATIONS  (PRN):  acetaminophen     Tablet .. 650 milliGRAM(s) Oral once PRN Temp greater or equal to 38C (100.4F), Mild Pain (1 - 3)  meclizine 25 milliGRAM(s) Oral once PRN Dizziness

## 2024-05-10 NOTE — SWALLOW BEDSIDE ASSESSMENT ADULT - COMMENTS
63 y/o Female, denies PMHx presented with room spinning, dizziness which was worse with head movement, she also had right ear fullness, uri symptoms, Patient went to an urgent care with these symptoms. Patient's friend reported that patient had intermittent periods of expressive aphasia in car on way to ED. Patient was given prednisone and meclozine which helped dizziness in urgent care. Code stroke was called in ED due to reported aphasia. No other complaint.   Service is consulted for po intake, and for speech-language

## 2024-05-17 DIAGNOSIS — R42 DIZZINESS AND GIDDINESS: ICD-10-CM

## 2024-05-17 DIAGNOSIS — E78.5 HYPERLIPIDEMIA, UNSPECIFIED: ICD-10-CM

## 2024-05-17 DIAGNOSIS — Q21.19 OTHER SPECIFIED ATRIAL SEPTAL DEFECT: ICD-10-CM

## 2024-10-24 ENCOUNTER — APPOINTMENT (OUTPATIENT)
Dept: CARDIOLOGY | Facility: CLINIC | Age: 64
End: 2024-10-24
Payer: COMMERCIAL

## 2024-10-24 VITALS
SYSTOLIC BLOOD PRESSURE: 100 MMHG | WEIGHT: 131 LBS | BODY MASS INDEX: 23.21 KG/M2 | OXYGEN SATURATION: 98 % | DIASTOLIC BLOOD PRESSURE: 56 MMHG | HEIGHT: 63 IN | HEART RATE: 94 BPM

## 2024-10-24 DIAGNOSIS — I49.8 OTHER SPECIFIED CARDIAC ARRHYTHMIAS: ICD-10-CM

## 2024-10-24 PROCEDURE — 93000 ELECTROCARDIOGRAM COMPLETE: CPT

## 2024-10-24 PROCEDURE — G2211 COMPLEX E/M VISIT ADD ON: CPT | Mod: NC

## 2024-10-24 PROCEDURE — 99204 OFFICE O/P NEW MOD 45 MIN: CPT

## 2024-10-24 RX ORDER — METOPROLOL SUCCINATE 25 MG/1
25 TABLET, EXTENDED RELEASE ORAL DAILY
Qty: 90 | Refills: 3 | Status: ACTIVE | COMMUNITY
Start: 2024-10-24 | End: 1900-01-01

## 2024-10-25 ENCOUNTER — NON-APPOINTMENT (OUTPATIENT)
Age: 64
End: 2024-10-25

## 2024-11-21 ENCOUNTER — APPOINTMENT (OUTPATIENT)
Dept: CARDIOLOGY | Facility: CLINIC | Age: 64
End: 2024-11-21
Payer: COMMERCIAL

## 2024-11-21 ENCOUNTER — APPOINTMENT (OUTPATIENT)
Dept: CARDIOLOGY | Facility: CLINIC | Age: 64
End: 2024-11-21

## 2024-11-21 ENCOUNTER — NON-APPOINTMENT (OUTPATIENT)
Age: 64
End: 2024-11-21

## 2024-11-21 VITALS
HEIGHT: 63 IN | BODY MASS INDEX: 23.21 KG/M2 | WEIGHT: 131 LBS | OXYGEN SATURATION: 98 % | HEART RATE: 65 BPM | DIASTOLIC BLOOD PRESSURE: 70 MMHG | SYSTOLIC BLOOD PRESSURE: 112 MMHG

## 2024-11-21 DIAGNOSIS — R00.2 PALPITATIONS: ICD-10-CM

## 2024-11-21 PROCEDURE — G2211 COMPLEX E/M VISIT ADD ON: CPT | Mod: NC

## 2024-11-21 PROCEDURE — 93000 ELECTROCARDIOGRAM COMPLETE: CPT

## 2024-11-21 PROCEDURE — 99214 OFFICE O/P EST MOD 30 MIN: CPT

## 2024-11-21 PROCEDURE — 99213 OFFICE O/P EST LOW 20 MIN: CPT

## 2024-11-21 PROCEDURE — 93306 TTE W/DOPPLER COMPLETE: CPT

## 2025-07-14 NOTE — ED PROVIDER NOTE - WR INTERPRETATION 1
Department of Anesthesiology  Preprocedure Note       Name:  Kay Barrera   Age:  89 y.o.  :  1935                                          MRN:  [id number]         Date:  2025      Surgeon: Surgeon(s):  Tian Chavarria MD    Procedure: Procedure(s):  ENDOSCOPIC RETROGRADE CHOLANGIOPANCREATOGRAPHY    Medications prior to admission:   Prior to Admission medications    Medication Sig Start Date End Date Taking? Authorizing Provider   pantoprazole (PROTONIX) 20 MG tablet Take 1 tablet by mouth daily  Patient not taking: Reported on 2025   Simeon Johnson APRN - CNP   clorazepate (TRANXENE) 7.5 MG tablet Take 0.5 tablets by mouth 2 times daily as needed for Anxiety.    Provider, MD Parmjit       Current medications:    Current Facility-Administered Medications   Medication Dose Route Frequency Provider Last Rate Last Admin    LORazepam (ATIVAN) tablet 1 mg  1 mg Oral BID Emilia Mcfarland APRN - NP   1 mg at 25    pantoprazole (PROTONIX) tablet 20 mg  20 mg Oral QAM AC Emilia Mcfarland APRN - NP   20 mg at 25 0616    sodium chloride flush 0.9 % injection 5-40 mL  5-40 mL IntraVENous 2 times per day Emilia Mcfarland APRN - NP   10 mL at 25 0839    sodium chloride flush 0.9 % injection 5-40 mL  5-40 mL IntraVENous PRN Emilia Mcfarland APRN - NP        0.9 % sodium chloride infusion   IntraVENous PRN Emilia Mcfarland APRN - NP        heparin (porcine) injection 5,000 Units  5,000 Units SubCUTAneous BID Emilia Mcfarland APRN - NP   5,000 Units at 25    ondansetron (ZOFRAN-ODT) disintegrating tablet 4 mg  4 mg Oral Q8H PRN Emilia Mcfarland APRN - NP   4 mg at 2516    Or    ondansetron (ZOFRAN) injection 4 mg  4 mg IntraVENous Q6H PRN Emilia Mcfarland APRN - NP   4 mg at 25    polyethylene glycol (GLYCOLAX) packet 17 g  17 g Oral Daily PRN Emilia Mcfarland APRN - NASIR        HYDROmorphone (DILAUDID) injection 0.25 
JOHN